# Patient Record
Sex: FEMALE | Race: WHITE | NOT HISPANIC OR LATINO
[De-identification: names, ages, dates, MRNs, and addresses within clinical notes are randomized per-mention and may not be internally consistent; named-entity substitution may affect disease eponyms.]

---

## 2020-11-12 ENCOUNTER — TRANSCRIPTION ENCOUNTER (OUTPATIENT)
Age: 50
End: 2020-11-12

## 2021-04-08 PROBLEM — Z00.00 ENCOUNTER FOR PREVENTIVE HEALTH EXAMINATION: Status: ACTIVE | Noted: 2021-04-08

## 2021-04-20 ENCOUNTER — TRANSCRIPTION ENCOUNTER (OUTPATIENT)
Age: 51
End: 2021-04-20

## 2021-04-20 ENCOUNTER — NON-APPOINTMENT (OUTPATIENT)
Age: 51
End: 2021-04-20

## 2021-04-20 ENCOUNTER — APPOINTMENT (OUTPATIENT)
Dept: PULMONOLOGY | Facility: CLINIC | Age: 51
End: 2021-04-20
Payer: COMMERCIAL

## 2021-04-20 VITALS
HEIGHT: 69 IN | WEIGHT: 155 LBS | BODY MASS INDEX: 22.96 KG/M2 | OXYGEN SATURATION: 98 % | HEART RATE: 81 BPM | SYSTOLIC BLOOD PRESSURE: 140 MMHG | DIASTOLIC BLOOD PRESSURE: 87 MMHG | TEMPERATURE: 97.3 F

## 2021-04-20 PROCEDURE — 99072 ADDL SUPL MATRL&STAF TM PHE: CPT

## 2021-04-20 PROCEDURE — 99242 OFF/OP CONSLTJ NEW/EST SF 20: CPT

## 2021-04-21 NOTE — HISTORY OF PRESENT ILLNESS
[TextBox_4] : covid in january\par \par passed out,\par \par d-dimer was slightly elevated, did a ct angio gram.\par \par initial bibasilar infiltrates near periphery\par \par enlarged R hilar node almost imperceptible\par \par repeat ct shows decreased in size of hilar nodle\par \par resolution of bibaslilar reticu findings could have been dependent atelectasis\par \par and innocuous area of bronchiolitis \par \par small nodule was seen, \par \par 50 healhty non smoker.\par \par never went to chest just wanted to make sure they have not a blood clot\par \par fever no lethargy, just cold

## 2021-04-21 NOTE — DISCUSSION/SUMMARY
[FreeTextEntry1] : all innocuous findings\par \par the r hilum was just above normal size\par \par bronchiolitis in innocous\par \par the basilar reticular findings could have been due to lack of breath hold. at deep inspiration, was not noted on 1/19/ 20 scan, and only reporterd on  april 6 2021 film

## 2021-04-22 ENCOUNTER — APPOINTMENT (OUTPATIENT)
Dept: ENDOCRINOLOGY | Facility: CLINIC | Age: 51
End: 2021-04-22
Payer: COMMERCIAL

## 2021-04-22 VITALS
BODY MASS INDEX: 22.59 KG/M2 | DIASTOLIC BLOOD PRESSURE: 90 MMHG | WEIGHT: 153 LBS | SYSTOLIC BLOOD PRESSURE: 136 MMHG | HEART RATE: 85 BPM

## 2021-04-22 DIAGNOSIS — E53.8 DEFICIENCY OF OTHER SPECIFIED B GROUP VITAMINS: ICD-10-CM

## 2021-04-22 DIAGNOSIS — Z72.89 OTHER PROBLEMS RELATED TO LIFESTYLE: ICD-10-CM

## 2021-04-22 DIAGNOSIS — R00.2 PALPITATIONS: ICD-10-CM

## 2021-04-22 DIAGNOSIS — Z78.9 OTHER SPECIFIED HEALTH STATUS: ICD-10-CM

## 2021-04-22 PROCEDURE — 99203 OFFICE O/P NEW LOW 30 MIN: CPT

## 2021-04-22 PROCEDURE — 99072 ADDL SUPL MATRL&STAF TM PHE: CPT

## 2021-04-23 PROBLEM — Z78.9 NON-SMOKER: Status: ACTIVE | Noted: 2021-04-23

## 2021-04-23 PROBLEM — Z72.89 ALCOHOL USE: Status: ACTIVE | Noted: 2021-04-23

## 2021-04-23 LAB
ANION GAP SERPL CALC-SCNC: 12 MMOL/L
BUN SERPL-MCNC: 15 MG/DL
CALCIUM SERPL-MCNC: 9.7 MG/DL
CHLORIDE SERPL-SCNC: 102 MMOL/L
CO2 SERPL-SCNC: 24 MMOL/L
CORTIS SERPL-MCNC: 17.3 UG/DL
CREAT SERPL-MCNC: 0.91 MG/DL
GLUCOSE SERPL-MCNC: 94 MG/DL
POTASSIUM SERPL-SCNC: 4.4 MMOL/L
SODIUM SERPL-SCNC: 138 MMOL/L
VIT B12 SERPL-MCNC: 531 PG/ML

## 2021-04-23 RX ORDER — NORETHINDRONE ACETATE AND ETHINYL ESTRADIOL, ETHINYL ESTRADIOL AND FERROUS FUMARATE 1MG-10(24)
1 MG-10 MCG / KIT ORAL
Refills: 0 | Status: ACTIVE | COMMUNITY

## 2021-04-23 NOTE — HISTORY OF PRESENT ILLNESS
[FreeTextEntry1] : Since last September, feeling vibrations in her body. Describes them as internal, no tremor.  Says it is like an electric toothbrush in her legs, body and arms.  Generally does not feel anxious but getting these vibrations makes her anxious.\par Saw neurologist in October and neuro exam was normal\par Initially, she was prescribed a beta blocker which improved her symptoms but then in mid January, she passed out after going to the bathroom. Syncopal episode thought to be due to a combination of having Covid, dehydration, beta blocker (BP was low) and maybe vasovagal (after bathroom).\par She got another neurologist opinion and was sent for MRI, which was normal. EMG was also normal.\par [facility] did [facility] bunch of tests and was told that "cortisol was through the roof"\par Energy is good, she exercises 5x/week\par occasionally feels an extra heart beat\par \par Meds:\par Lo Loestrin Fe\par supplements:  Herbal immunity, pharmaGABA, Gabatrex, Travacore, B12

## 2021-04-23 NOTE — PHYSICAL EXAM
[Alert] : alert [No Acute Distress] : no acute distress [No Proptosis] : no proptosis [No Lid Lag] : no lid lag [Normal Hearing] : hearing was normal [No LAD] : no lymphadenopathy [Thyroid Not Enlarged] : the thyroid was not enlarged [Clear to Auscultation] : lungs were clear to auscultation bilaterally [Normal S1, S2] : normal S1 and S2 [Regular Rhythm] : with a regular rhythm [Normal Affect] : the affect was normal [Normal Mood] : the mood was normal [Acanthosis Nigricans] : no acanthosis nigricans

## 2021-04-23 NOTE — DATA REVIEWED
[FreeTextEntry1] : 3/21: tot chol 204, trig 121, HDL 70, , a1c 4.9%, TSH 2.25, B12 286, FSH 47, DHEAS 195, 25D 53\par urine free cortisol samples:  65 (am), 102 (am), 35 (pm), 17 (hs)

## 2021-04-23 NOTE — ASSESSMENT
[FreeTextEntry1] : Palpitations, anxiety\par Reassured pt that her high cortisol levels still follow normal diurnal variation, which is highest in the morning and then waning through the day, with lowest level near midnight/bedtime.   Which means that her response is physiologic, due to stress,  but not pathologic (which would be high cortisol level all the time, including at night).\par There may be an underlying stress that she doesn't outwardly feel or recognize, but her body perceives some kind of stress (possible from pandemic, election, race relations, father dying suddenly at age 51?) and I think once this stress has passed, then she will not have these symptoms any more and her cortisol will return to normal.  In the meantime, she can take medication to deal with stress/anxiety and continue other stress reducing measures (exercise, meditation)\par will recheck cortisol today (late morning) and recheck B12 (was low and she is taking supplement)\par RTO prn\par

## 2021-04-23 NOTE — CONSULT LETTER
[Dear  ___] : Dear  [unfilled], [Consult Letter:] : I had the pleasure of evaluating your patient, [unfilled]. [Please see my note below.] : Please see my note below. [Consult Closing:] : Thank you very much for allowing me to participate in the care of this patient.  If you have any questions, please do not hesitate to contact me. [Sincerely,] : Sincerely, [FreeTextEntry1] : Ms. Key was found to have elevated cortisol but it still follows the physiologic diurnal pattern (ie I don't think she has Cushings syndrome).  So I think her symptoms are stress related and I recommended she continue working on reducing her stress or having an outlet for stress (exercise, meditation).  \par  [FreeTextEntry3] : Shanna Burns MD\par Division of Endocrinology\par [facility] Phys[facility]

## 2021-05-01 LAB
METANEPHRINE, PL: 15.3 PG/ML
NORMETANEPHRINE, PL: 86 PG/ML

## 2022-09-20 ENCOUNTER — APPOINTMENT (OUTPATIENT)
Dept: VASCULAR SURGERY | Facility: CLINIC | Age: 52
End: 2022-09-20

## 2022-09-20 PROCEDURE — 93970 EXTREMITY STUDY: CPT

## 2023-10-30 ENCOUNTER — ASOB RESULT (OUTPATIENT)
Age: 53
End: 2023-10-30

## 2023-10-30 ENCOUNTER — APPOINTMENT (OUTPATIENT)
Dept: ANTEPARTUM | Facility: CLINIC | Age: 53
End: 2023-10-30
Payer: COMMERCIAL

## 2023-10-30 PROCEDURE — 76376 3D RENDER W/INTRP POSTPROCES: CPT | Mod: NC

## 2023-10-30 PROCEDURE — 76856 US EXAM PELVIC COMPLETE: CPT | Mod: 59

## 2023-10-30 PROCEDURE — 58340 CATHETER FOR HYSTEROGRAPHY: CPT

## 2023-10-30 PROCEDURE — 76830 TRANSVAGINAL US NON-OB: CPT | Mod: 59

## 2023-10-30 PROCEDURE — 76831 ECHO EXAM UTERUS: CPT
